# Patient Record
Sex: FEMALE | Race: BLACK OR AFRICAN AMERICAN | NOT HISPANIC OR LATINO | Employment: PART TIME | URBAN - METROPOLITAN AREA
[De-identification: names, ages, dates, MRNs, and addresses within clinical notes are randomized per-mention and may not be internally consistent; named-entity substitution may affect disease eponyms.]

---
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
## 2024-09-24 ENCOUNTER — TRANSCRIBE ORDERS (OUTPATIENT)
Dept: OTHER | Age: 19
End: 2024-09-24

## 2024-09-24 DIAGNOSIS — A04.8 H. PYLORI INFECTION: ICD-10-CM

## 2024-09-24 DIAGNOSIS — R10.84 ABDOMINAL PAIN, GENERALIZED: ICD-10-CM

## 2024-09-24 DIAGNOSIS — R11.2 NAUSEA AND VOMITING, UNSPECIFIED VOMITING TYPE: Primary | ICD-10-CM

## 2024-09-26 ENCOUNTER — TELEPHONE (OUTPATIENT)
Dept: GASTROENTEROLOGY | Facility: CLINIC | Age: 19
End: 2024-09-26

## 2024-09-26 NOTE — TELEPHONE ENCOUNTER
[facility] Call Center    Phone Message    May a detailed message be left on voicemail: Yes    Reason for Call: Other: Patient is currently scheduled on 12/12, as visit type New GI Urgent. This is outside the expected timeline for this referral. Patient has been added to the waitlist.      In-person    Action Taken: Message routed to:  Other: GI REFERRAL TRIAGE POOL     Travel Screening: Not Applicable

## 2024-10-08 NOTE — TELEPHONE ENCOUNTER
REFERRAL INFORMATION:  Referring Provider:  Lisa Hopkins CNM   Referring Clinic:  [facility]   Reason for Visit/Diagnosis:    Nausea and vomiting, unspecified vomiting type   Abdominal pain, generalized   H. pylori infection        FUTURE VISIT INFORMATION:  Appointment Date: 12/12/2024  Appointment Time:      NOTES STATUS DETAILS   OFFICE NOTE from Referring Provider [facility]:   9/23/2024, 9/19/2024 OV with LESLI Hopkins   12/22/2022 OV with EUFEMIA Beard   OFFICE NOTE from Other Specialist N/A    HOSPITAL DISCHARGE SUMMARY/  ED VISITS N/A    OPERATIVE REPORT N/A    MEDICATION LIST Internal         ENDOSCOPY  N/A    COLONOSCOPY N/A    IMAGING (CT, MRI, EGD, MRCP, Small Bowel Follow Through/SBT, MR/CT Enterography) N/A

## 2024-11-18 NOTE — TELEPHONE ENCOUNTER
138 Patient is now scheduled within the appropriate time frame of one month for urgent triaged referrals. No rescheduling is needed anymore.    Scheduled with Gastroenterology (Mirella Johnson PA-C)  12/12/2024 at 12:00 PM     Closing encounter.      Charis Sim, [facility]     10-Apr-2019

## 2024-12-05 ENCOUNTER — TELEPHONE (OUTPATIENT)
Dept: GASTROENTEROLOGY | Facility: CLINIC | Age: 19
End: 2024-12-05
Payer: COMMERCIAL

## 2024-12-05 NOTE — TELEPHONE ENCOUNTER
I attempted an appointment reminder call for their upcoming visit on 12/12 with Mirella Johnson but the voice mail was not set up yet. Unable to leave a message.  Valeria Soriano

## 2024-12-12 ENCOUNTER — PRE VISIT (OUTPATIENT)
Dept: GASTROENTEROLOGY | Facility: CLINIC | Age: 19
End: 2024-12-12